# Patient Record
Sex: MALE | Race: WHITE | NOT HISPANIC OR LATINO
[De-identification: names, ages, dates, MRNs, and addresses within clinical notes are randomized per-mention and may not be internally consistent; named-entity substitution may affect disease eponyms.]

---

## 2018-02-06 ENCOUNTER — HOSPITAL (OUTPATIENT)
Dept: OTHER | Age: 83
End: 2018-02-06
Attending: FAMILY MEDICINE

## 2018-03-08 ENCOUNTER — HOSPITAL (OUTPATIENT)
Dept: OTHER | Age: 83
End: 2018-03-08
Attending: INTERNAL MEDICINE

## 2018-03-08 LAB
A/G RATIO_: 0.9
ABS LYMPH: 1.7 K/CUMM (ref 1–3.5)
ABS MONO: 0.5 K/CUMM (ref 0.1–0.8)
ABS NEUTRO: 3.4 K/CUMM (ref 2–8)
ALBUMIN: 3.1 G/DL (ref 3.5–5)
ALK PHOS: 72 UNIT/L (ref 50–124)
ALT/GPT: 70 UNIT/L (ref 0–55)
ANION GAP SERPL CALC-SCNC: 13 MEQ/L (ref 10–20)
APTT PPP: 24 SECONDS (ref 22–30)
APTT PPP: 38 SECONDS (ref 22–30)
AST/GOT: 50 UNIT/L (ref 5–34)
BASOPHIL: 0 % (ref 0–1)
BILI TOTAL: 0.5 MG/DL (ref 0.2–1)
BNP: 1234 PG/ML (ref 1–100)
BUN SERPL-MCNC: 41 MG/DL (ref 6–20)
CALCIUM: 9.1 MG/DL (ref 8.4–10.2)
CHLORIDE: 110 MEQ/L (ref 97–107)
CREATININE: 1.13 MG/DL (ref 0.6–1.3)
DIFF_TYPE?: ABNORMAL
EOSINOPHIL: 3 % (ref 0–6)
GLOBULIN_: 3.3 G/DL (ref 2–4.1)
GLUCOSE LVL: 107 MG/DL (ref 70–99)
HCT VFR BLD CALC: 38 % (ref 36–51)
HEMOLYSIS 2+: NEGATIVE
HEMOLYSIS 4+: NEGATIVE
HGB BLD-MCNC: 11.9 G/DL (ref 12–17)
ICTERIC 4+: NEGATIVE
IMMATURE GRAN: 0.3 % (ref 0–0.3)
INR: 1.1 (ref 0.9–1.1)
INSTR WBC: 5.8 K/CUMM (ref 4–11)
LIPEMIC 3+: NEGATIVE
LYMPHOCYTE: 29 %
MCH RBC QN AUTO: 30 PG (ref 25–35)
MCHC RBC AUTO-ENTMCNC: 32 G/DL (ref 32–37)
MCV RBC AUTO: 95 FL (ref 78–97)
MONOCYTE: 9 %
NEUTROPHIL: 59 %
NRBC BLD MANUAL-RTO: 0 % (ref 0–0.2)
PLATELET: 163 K/CUMM (ref 150–450)
POTASSIUM: 4.8 MEQ/L (ref 3.5–5.1)
PROTHROMBIN TIME: 10.8 SECONDS (ref 9.7–11.6)
RBC # BLD: 3.95 M/CUMM (ref 4.2–6)
RDW: 14.1 % (ref 11.5–14.5)
SODIUM: 143 MEQ/L (ref 136–145)
TCO2: 25 MEQ/L (ref 19–29)
TOTAL PROTEIN: 6.4 G/DL (ref 6.4–8.3)
TROPONIN I: 0.03 NG/ML
TROPONIN I: 0.04 NG/ML
TROPONIN I: 0.04 NG/ML
WBC # BLD: 5.8 K/CUMM (ref 4–11)

## 2018-03-09 LAB
ABS LYMPH: 1.7 K/CUMM (ref 1–3.5)
ABS MONO: 0.3 K/CUMM (ref 0.1–0.8)
ABS NEUTRO: 3.5 K/CUMM (ref 2–8)
ANION GAP SERPL CALC-SCNC: 14 MEQ/L (ref 10–20)
APTT PPP: 46 SECONDS (ref 22–30)
APTT PPP: 46 SECONDS (ref 22–30)
BASOPHIL: 0 % (ref 0–1)
BNP: 1112 PG/ML (ref 1–100)
BUN SERPL-MCNC: 40 MG/DL (ref 6–20)
CALCIUM: 9.7 MG/DL (ref 8.4–10.2)
CHLORIDE: 105 MEQ/L (ref 97–107)
CREATININE: 1.5 MG/DL (ref 0.6–1.3)
DIFF_TYPE?: ABNORMAL
EOSINOPHIL: 3 % (ref 0–6)
GLUCOSE LVL: 153 MG/DL (ref 70–99)
HCT VFR BLD CALC: 40 % (ref 36–51)
HEMOLYSIS 2+: NEGATIVE
HEMOLYSIS 2+: NEGATIVE
HEMOLYSIS 4+: NEGATIVE
HGB BLD-MCNC: 12.6 G/DL (ref 12–17)
IMMATURE GRAN: 0.4 % (ref 0–0.3)
INR: 1.3 (ref 0.9–1.1)
INSTR WBC: 5.7 K/CUMM (ref 4–11)
LYMPHOCYTE: 29 %
MAGNESIUM LEVEL: 1.8 MG/DL (ref 1.6–2.6)
MCH RBC QN AUTO: 30 PG (ref 25–35)
MCHC RBC AUTO-ENTMCNC: 31 G/DL (ref 32–37)
MCV RBC AUTO: 95 FL (ref 78–97)
MONOCYTE: 6 %
NEUTROPHIL: 62 %
NRBC BLD MANUAL-RTO: 0 % (ref 0–0.2)
PLATELET: 163 K/CUMM (ref 150–450)
POTASSIUM: 4 MEQ/L (ref 3.5–5.1)
PROTHROMBIN TIME: 12.7 SECONDS (ref 9.7–11.6)
RBC # BLD: 4.25 M/CUMM (ref 4.2–6)
RDW: 13.8 % (ref 11.5–14.5)
SODIUM: 143 MEQ/L (ref 136–145)
TCO2: 28 MEQ/L (ref 19–29)
WBC # BLD: 5.7 K/CUMM (ref 4–11)

## 2018-04-03 ENCOUNTER — HOSPITAL (OUTPATIENT)
Dept: OTHER | Age: 83
End: 2018-04-03

## 2018-04-03 LAB
ANION GAP SERPL CALC-SCNC: 13 MEQ/L (ref 10–20)
BUN SERPL-MCNC: 76 MG/DL (ref 6–20)
CALCIUM: 9.3 MG/DL (ref 8.4–10.2)
CHLORIDE: 104 MEQ/L (ref 97–107)
CREATININE: 1.93 MG/DL (ref 0.6–1.3)
GLUCOSE LVL: 86 MG/DL (ref 70–99)
HEMOLYSIS 2+: NEGATIVE
POTASSIUM: 5.3 MEQ/L (ref 3.5–5.1)
SODIUM: 139 MEQ/L (ref 136–145)
TCO2: 27 MEQ/L (ref 19–29)

## 2018-12-17 ENCOUNTER — HOSPITAL (OUTPATIENT)
Dept: OTHER | Age: 83
End: 2018-12-17
Attending: INTERNAL MEDICINE

## 2019-03-22 RX ORDER — BUMETANIDE 0.5 MG/1
0.5 TABLET ORAL DAILY
COMMUNITY
End: 2019-08-29 | Stop reason: SDUPTHER

## 2019-03-22 RX ORDER — PRAVASTATIN SODIUM 40 MG
40 TABLET ORAL DAILY
COMMUNITY

## 2019-03-22 RX ORDER — CARVEDILOL 3.12 MG/1
TABLET ORAL
COMMUNITY

## 2019-05-10 ENCOUNTER — OFFICE VISIT (OUTPATIENT)
Dept: CARDIOLOGY | Age: 84
End: 2019-05-10

## 2019-05-10 VITALS
DIASTOLIC BLOOD PRESSURE: 73 MMHG | HEART RATE: 55 BPM | HEIGHT: 70 IN | WEIGHT: 143.3 LBS | BODY MASS INDEX: 20.52 KG/M2 | SYSTOLIC BLOOD PRESSURE: 117 MMHG

## 2019-05-10 DIAGNOSIS — I50.32 CHRONIC DIASTOLIC HEART FAILURE (CMD): ICD-10-CM

## 2019-05-10 DIAGNOSIS — I48.21 PERMANENT ATRIAL FIBRILLATION (CMD): Primary | ICD-10-CM

## 2019-05-10 DIAGNOSIS — E78.49 OTHER HYPERLIPIDEMIA: ICD-10-CM

## 2019-05-10 DIAGNOSIS — I10 ESSENTIAL HYPERTENSION: ICD-10-CM

## 2019-05-10 PROBLEM — I50.30 DIASTOLIC HEART FAILURE (CMD): Status: ACTIVE | Noted: 2019-05-10

## 2019-05-10 PROCEDURE — 99214 OFFICE O/P EST MOD 30 MIN: CPT | Performed by: INTERNAL MEDICINE

## 2019-05-10 ASSESSMENT — PATIENT HEALTH QUESTIONNAIRE - PHQ9
SUM OF ALL RESPONSES TO PHQ9 QUESTIONS 1 AND 2: 0
SUM OF ALL RESPONSES TO PHQ9 QUESTIONS 1 AND 2: 0
2. FEELING DOWN, DEPRESSED OR HOPELESS: NOT AT ALL
1. LITTLE INTEREST OR PLEASURE IN DOING THINGS: NOT AT ALL

## 2019-08-27 ENCOUNTER — TELEPHONE (OUTPATIENT)
Dept: CARDIOLOGY | Age: 84
End: 2019-08-27

## 2019-08-29 RX ORDER — BUMETANIDE 0.5 MG/1
0.5 TABLET ORAL DAILY
Qty: 90 TABLET | Refills: 0 | Status: SHIPPED | OUTPATIENT
Start: 2019-08-29

## 2020-12-03 PROBLEM — R42 DIZZINESS AND GIDDINESS: Status: ACTIVE | Noted: 2020-12-03

## 2020-12-03 PROBLEM — J90 PLEURAL EFFUSION: Status: ACTIVE | Noted: 2020-12-03

## 2020-12-03 PROBLEM — I48.20 CHRONIC ATRIAL FIBRILLATION (H): Status: ACTIVE | Noted: 2020-12-03

## 2020-12-03 PROBLEM — I50.9 CHF (CONGESTIVE HEART FAILURE) (H): Status: ACTIVE | Noted: 2020-12-03

## 2020-12-03 PROBLEM — R33.9 RETENTION OF URINE: Status: ACTIVE | Noted: 2020-12-03

## 2020-12-03 PROBLEM — R00.1 BRADYCARDIA: Status: ACTIVE | Noted: 2020-12-03

## 2020-12-03 PROBLEM — N48.1 BALANITIS: Status: ACTIVE | Noted: 2020-12-03

## 2021-02-07 PROBLEM — L89.612 PRESSURE INJURY OF RIGHT HEEL, STAGE 2 (H): Status: ACTIVE | Noted: 2021-02-07

## 2021-07-06 PROBLEM — L89.512: Status: ACTIVE | Noted: 2021-07-06

## 2021-11-04 ENCOUNTER — APPOINTMENT (OUTPATIENT)
Dept: FAMILY MEDICINE | Facility: OTHER | Age: 86
End: 2021-11-04
Attending: FAMILY MEDICINE
Payer: COMMERCIAL

## 2021-12-02 PROBLEM — L98.9 DISORDER OF SKIN OR SUBCUTANEOUS TISSUE: Status: ACTIVE | Noted: 2021-05-05

## 2021-12-02 PROBLEM — M19.90 CHRONIC ARTHRITIS: Status: ACTIVE | Noted: 2021-12-02

## 2021-12-02 PROBLEM — R62.7 ADULT FAILURE TO THRIVE: Status: ACTIVE | Noted: 2020-12-03

## 2021-12-02 PROBLEM — I48.91 ATRIAL FIBRILLATION (H): Status: ACTIVE | Noted: 2020-12-03

## 2021-12-02 PROBLEM — I25.10 CORONARY ATHEROSCLEROSIS: Status: ACTIVE | Noted: 2021-12-02

## 2021-12-02 PROBLEM — I10 CHRONIC HYPERTENSION: Status: ACTIVE | Noted: 2021-12-02

## 2021-12-02 PROBLEM — H91.93 BILATERAL HEARING LOSS: Status: ACTIVE | Noted: 2021-05-04

## 2021-12-02 PROBLEM — E78.5 HYPERLIPIDEMIA: Status: ACTIVE | Noted: 2021-12-02

## 2022-01-01 ENCOUNTER — NURSING HOME VISIT (OUTPATIENT)
Dept: FAMILY MEDICINE | Facility: OTHER | Age: 87
End: 2022-01-01
Attending: FAMILY MEDICINE
Payer: COMMERCIAL

## 2022-01-01 DIAGNOSIS — Z53.9 ERRONEOUS ENCOUNTER--DISREGARD: Primary | ICD-10-CM

## 2022-01-01 PROCEDURE — 10000001 PR ERRONEOUS ENCOUNTER--DISREGARD: Performed by: FAMILY MEDICINE

## 2022-05-05 ENCOUNTER — NURSING HOME VISIT (OUTPATIENT)
Dept: FAMILY MEDICINE | Facility: OTHER | Age: 87
End: 2022-05-05
Attending: FAMILY MEDICINE
Payer: COMMERCIAL

## 2022-05-05 DIAGNOSIS — Z53.9 ERRONEOUS ENCOUNTER--DISREGARD: Primary | ICD-10-CM

## 2023-01-01 ENCOUNTER — NURSING HOME VISIT (OUTPATIENT)
Dept: FAMILY MEDICINE | Facility: OTHER | Age: 88
End: 2023-01-01
Attending: FAMILY MEDICINE
Payer: COMMERCIAL

## 2023-01-01 VITALS — HEIGHT: 63 IN | WEIGHT: 124.5 LBS | BODY MASS INDEX: 22.06 KG/M2

## 2023-01-01 VITALS — BODY MASS INDEX: 23.04 KG/M2 | WEIGHT: 130 LBS | HEIGHT: 63 IN

## 2023-01-01 VITALS — WEIGHT: 135 LBS | HEIGHT: 63 IN | BODY MASS INDEX: 23.92 KG/M2

## 2023-01-01 VITALS — WEIGHT: 137.5 LBS | BODY MASS INDEX: 24.36 KG/M2 | HEIGHT: 63 IN

## 2023-01-01 DIAGNOSIS — Z78.9 NURSING HOME RESIDENT: Primary | ICD-10-CM

## 2023-01-01 DIAGNOSIS — L89.512 PRESSURE ULCER OF RIGHT ANKLE, STAGE 2 (H): ICD-10-CM

## 2023-01-01 DIAGNOSIS — L89.512 PRESSURE ULCER OF RIGHT ANKLE, STAGE 2 (H): Primary | ICD-10-CM

## 2023-01-01 DIAGNOSIS — L89.612 PRESSURE INJURY OF RIGHT HEEL, STAGE 2 (H): ICD-10-CM

## 2023-01-01 DIAGNOSIS — L89.612 PRESSURE INJURY OF RIGHT HEEL, STAGE 2 (H): Primary | ICD-10-CM

## 2023-01-01 DIAGNOSIS — Z53.9 ERRONEOUS ENCOUNTER--DISREGARD: Primary | ICD-10-CM

## 2023-01-01 PROCEDURE — 99308 SBSQ NF CARE LOW MDM 20: CPT | Performed by: FAMILY MEDICINE

## 2023-01-01 RX ORDER — SULFAMETHOXAZOLE/TRIMETHOPRIM 800-160 MG
1 TABLET ORAL 2 TIMES DAILY
COMMUNITY
End: 2023-01-01

## 2023-01-01 RX ORDER — CIPROFLOXACIN 250 MG/1
250 TABLET, FILM COATED ORAL 2 TIMES DAILY
COMMUNITY
End: 2023-01-01

## 2023-01-01 RX ORDER — LOPERAMIDE HCL 2 MG
2 CAPSULE ORAL PRN
COMMUNITY

## 2023-01-01 RX ORDER — MUPIROCIN 20 MG/G
OINTMENT TOPICAL DAILY
COMMUNITY

## 2023-01-01 RX ORDER — HYDROCODONE BITARTRATE AND ACETAMINOPHEN 5; 325 MG/1; MG/1
TABLET ORAL
Qty: 60 TABLET | Refills: 0 | Status: SHIPPED | OUTPATIENT
Start: 2023-01-01

## 2023-01-01 RX ORDER — MEDICAL SUPPLY, MISCELLANEOUS
EACH MISCELLANEOUS DAILY
COMMUNITY

## 2023-01-01 RX ORDER — MELATONIN 10 MG
CAPSULE ORAL DAILY
COMMUNITY

## 2023-01-01 RX ORDER — LOPERAMIDE HCL 2 MG
2 CAPSULE ORAL PRN
COMMUNITY
End: 2023-01-01

## 2023-01-01 RX ORDER — PROPYLENE GLYCOL 0.06 MG/ML
SOLUTION/ DROPS OPHTHALMIC
COMMUNITY
Start: 2022-02-21

## 2023-01-01 RX ORDER — HYDROCODONE BITARTRATE AND ACETAMINOPHEN 5; 325 MG/1; MG/1
1 TABLET ORAL EVERY 4 HOURS PRN
COMMUNITY
End: 2023-01-01

## 2023-02-16 NOTE — PROGRESS NOTES
Nursing Home Visit    HISTORY OF PRESENT ILLNESS:  Clovis is a 89 year old male (6/29/1924)  resident of [facility] who is being seen today for regulatory NH visit as well as wound evaluation.    The patient has risk factors for skin breakdown including immobility, poor nutrition , advanced age, and altered mental status due to dementia.    He has a history of the following wounds:    Pressure ulcer, stage 2, right lateral ankle  The right lateral ankle wound developed in the facility and measures 0.3 x 0.6 x 0.1 as compared to a previous of 0.5 x 0.6x 0.1.  There is no associated pain,  drainage,  bleeding,  induration, or maceration. Clovis prefers to lay on his side and family notes presence over the last several years. The pressure areas are his position of comfort.  Protective measures in place.This has now healed    The patient notes no associated symptoms.        Discussed with nursing staff who note no other issues.    The patient is seen in his room.  Family is not present.     Medical records are reviewed.    Current medications, allergies, and interdisciplinary care plan are reviewed.      Patient Active Problem List    Diagnosis Date Noted     Chronic arthritis 12/02/2021     Priority: Medium     Chronic hypertension 12/02/2021     Priority: Medium     Coronary atherosclerosis 12/02/2021     Priority: Medium     Hyperlipidemia 12/02/2021     Priority: Medium     Pressure ulcer of right ankle, stage 2 (H) 07/06/2021     Priority: Medium     Disorder of skin or subcutaneous tissue 05/05/2021     Priority: Medium     Bilateral hearing loss 05/04/2021     Priority: Medium     Pressure injury of right heel, stage 2 (H) 02/07/2021     Priority: Medium     Adult failure to thrive 12/03/2020     Priority: Medium     Retention of urine 12/03/2020     Priority: Medium     CHF (congestive heart failure) (H) 12/03/2020     Priority: Medium     Bradycardia 12/03/2020     Priority: Medium     Atrial  fibrillation (H) 12/03/2020     Priority: Medium     Dizziness and giddiness 12/03/2020     Priority: Medium     Pleural effusion 12/03/2020     Priority: Medium     Balanitis 12/03/2020     Priority: Medium          Social History     Socioeconomic History     Marital status:      Spouse name: Not on file     Number of children: Not on file     Years of education: Not on file     Highest education level: Not on file   Occupational History     Not on file   Tobacco Use     Smoking status: Not on file     Smokeless tobacco: Not on file   Substance and Sexual Activity     Alcohol use: Not on file     Drug use: Not on file     Sexual activity: Not on file   Other Topics Concern     Not on file   Social History Narrative     Not on file     Social Determinants of Health     Financial Resource Strain: Not on file   Food Insecurity: Not on file   Transportation Needs: Not on file   Physical Activity: Not on file   Stress: Not on file   Social Connections: Not on file   Intimate Partner Violence: Not on file   Housing Stability: Not on file        Current Outpatient Medications   Medication Sig     acetaminophen (TYLENOL) 500 MG tablet Take 500 mg by mouth every 8 hours as needed for mild pain     artificial tears OINT ophthalmic ointment Place into both eyes every 4 hours as needed for dry eyes     aspirin 81 MG EC tablet Take 81 mg by mouth daily     ciprofloxacin (CIPRO) 250 MG tablet Take 250 mg by mouth 2 times daily     loperamide (IMODIUM) 2 MG capsule Take 2 mg by mouth as needed for diarrhea     losartan (COZAAR) 25 MG tablet Take 12.5 mg by mouth     metoprolol succinate ER (TOPROL-XL) 25 MG 24 hr tablet Take 12.5 mg by mouth daily     multivitamin w/minerals (THERA-VIT-M) tablet Take 1 tablet by mouth daily     Nutritional Supplements (NUTRITIONAL DRINK) LIQD Take by mouth daily Boost     propylene glycol (SYSTANE COMPLETE) 0.6 % SOLN ophthalmic solution PLACE 1-2 DROPS INTO EACH EYE AS NEEDED      "senna-docusate (SENOKOT-S/PERICOLACE) 8.6-50 MG tablet Take 1 tablet by mouth 2 times daily     No current facility-administered medications for this visit.       No Known Allergies    I have reviewed the care plan and do agree with the plan.    OBJECTIVE:  Ht 1.6 m (5' 3\")   Wt 56.5 kg (124 lb 8 oz)   BMI 22.05 kg/m      GENERAL: Healthy, alert and no distress  EYES: Eyes grossly normal to inspection.  No discharge or erythema, or obvious scleral/conjunctival abnormalities.  RESP: No audible wheeze, cough, or visible cyanosis.  No visible retractions or increased work of breathing.    SKIN: no suspicious lesions or rashes.  Wounds as above      Previous            Current    Lab/Diagnostic data:    Reviewed     ASSESSMENT/ORDERS:  Nursing Home Visit  Discussed with staff. Care plan reviewed and orders updated.  Chronic issues are stable. Routine 30 day Nursing Home follow up.     Pressure ulcer of right ankle, stage 2 (H)  Continue Gel Boot to heel.  Allevyn 3x3.  Artificial lambs wool at foot of bed.        Total time spent with patient visit was 25 min including patient visit, review of pertinent clinical information, and treatment plan.      Kishore Hi MD FAAFP [facility]  Geriatrics  Hospice and Palliative Medicine  "

## 2023-03-21 NOTE — PROGRESS NOTES
Nursing Home Visit    HISTORY OF PRESENT ILLNESS:  Clovis is a 89 year old male (6/29/1924)  resident of [facility] who is being seen today for regulatory NH visit as well as wound evaluation.    The patient has risk factors for skin breakdown including immobility, poor nutrition , advanced age, and altered mental status due to dementia.    He has a history of the following wounds:    Pressure ulcer, stage 2, right lateral ankle  The right lateral ankle wound developed in the facility and measures 0.3 x 0.6 x 0.1 as compared to a previous of 0.5 x 0.6x 0.1.  There is no associated pain,  drainage,  bleeding,  induration, or maceration. Clovis prefers to lay on his side and family notes presence over the last several years. The pressure areas are his position of comfort.  Protective measures in place.    Pressure ulcer, stage 2 right heel  He has developed pressure ulcer right heel.  There is no associated pain, bleeding, induration, or maceration.  This measures 2.5 cm x 1.7cm x 0.1 cm.  The patient notes no associated symptoms.        Discussed with nursing staff who note no other issues.    The patient is seen in his room.  Family is not present.     Medical records are reviewed.    Current medications, allergies, and interdisciplinary care plan are reviewed.      Patient Active Problem List    Diagnosis Date Noted     Chronic arthritis 12/02/2021     Priority: Medium     Chronic hypertension 12/02/2021     Priority: Medium     Coronary atherosclerosis 12/02/2021     Priority: Medium     Hyperlipidemia 12/02/2021     Priority: Medium     Pressure ulcer of right ankle, stage 2 (H) 07/06/2021     Priority: Medium     Disorder of skin or subcutaneous tissue 05/05/2021     Priority: Medium     Bilateral hearing loss 05/04/2021     Priority: Medium     Pressure injury of right heel, stage 2 (H) 02/07/2021     Priority: Medium     Adult failure to thrive 12/03/2020     Priority: Medium     Retention of urine  12/03/2020     Priority: Medium     CHF (congestive heart failure) (H) 12/03/2020     Priority: Medium     Bradycardia 12/03/2020     Priority: Medium     Atrial fibrillation (H) 12/03/2020     Priority: Medium     Dizziness and giddiness 12/03/2020     Priority: Medium     Pleural effusion 12/03/2020     Priority: Medium     Balanitis 12/03/2020     Priority: Medium          Social History     Socioeconomic History     Marital status:      Spouse name: Not on file     Number of children: Not on file     Years of education: Not on file     Highest education level: Not on file   Occupational History     Not on file   Tobacco Use     Smoking status: Not on file     Smokeless tobacco: Not on file   Substance and Sexual Activity     Alcohol use: Not on file     Drug use: Not on file     Sexual activity: Not on file   Other Topics Concern     Not on file   Social History Narrative     Not on file     Social Determinants of Health     Financial Resource Strain: Not on file   Food Insecurity: Not on file   Transportation Needs: Not on file   Physical Activity: Not on file   Stress: Not on file   Social Connections: Not on file   Intimate Partner Violence: Not on file   Housing Stability: Not on file        Current Outpatient Medications   Medication Sig     acetaminophen (TYLENOL) 500 MG tablet Take 500 mg by mouth every 8 hours as needed for mild pain     amoxicillin-clavulanate (AUGMENTIN) 875-125 MG tablet Take 1 tablet by mouth 2 times daily     artificial tears OINT ophthalmic ointment Place into both eyes every 4 hours as needed for dry eyes     aspirin 81 MG EC tablet Take 81 mg by mouth daily     HYDROcodone-acetaminophen (NORCO) 5-325 MG tablet Take 1 tablet by mouth every 4 hours as needed for severe pain (7-10)     losartan (COZAAR) 25 MG tablet Take 12.5 mg by mouth     metoprolol succinate ER (TOPROL-XL) 25 MG 24 hr tablet Take 12.5 mg by mouth daily     multivitamin w/minerals (THERA-VIT-M) tablet Take  "1 tablet by mouth daily     mupirocin (BACTROBAN) 2 % external ointment Apply topically daily     Nutritional Supplements (NUTRITIONAL DRINK) LIQD Take by mouth daily Boost     propylene glycol (SYSTANE COMPLETE) 0.6 % SOLN ophthalmic solution PLACE 1-2 DROPS INTO EACH EYE AS NEEDED     senna-docusate (SENOKOT-S/PERICOLACE) 8.6-50 MG tablet Take 1 tablet by mouth 2 times daily     sulfamethoxazole-trimethoprim (BACTRIM DS) 800-160 MG tablet Take 1 tablet by mouth 2 times daily     No current facility-administered medications for this visit.       No Known Allergies    I have reviewed the care plan and do agree with the plan.    OBJECTIVE:  Ht 1.6 m (5' 3\")   Wt 62.4 kg (137 lb 8 oz)   BMI 24.36 kg/m      GENERAL: Healthy, alert and no distress  EYES: Eyes grossly normal to inspection.  No discharge or erythema, or obvious scleral/conjunctival abnormalities.  RESP: No audible wheeze, cough, or visible cyanosis.  No visible retractions or increased work of breathing.    SKIN: no suspicious lesions or rashes.  Wounds as above      Previous        Previous      Previous      Current        Current      Lab/Diagnostic data:    Reviewed     ASSESSMENT/ORDERS:      Pressure injury of right heel, stage 2 (H)  He was seen by Podiatry. Orders written for Bactroban to wound bed and cover wit 4X4 gauze secured with Kerlix.  Will continue.       Pressure ulcer of right ankle, stage 2 (H)  Bactroban to wound bed and cover wit 4X4 gauze secured with Kerlix. Continue Artificial lambs wool at foot of bed.        Total time spent with patient visit was 25 min including patient visit, review of pertinent clinical information, and treatment plan.      Kishore Hi MD FAAFP [facility]  Geriatrics  Hospice and Palliative Medicine  "

## 2023-04-06 NOTE — PROGRESS NOTES
Nursing Home Visit    HISTORY OF PRESENT ILLNESS:  Clovis is a 89 year old male (6/29/1924)  resident of [facility] who is being seen today for regulatory NH visit as well as wound evaluation.    The patient has risk factors for skin breakdown including immobility, poor nutrition , advanced age, and altered mental status due to dementia.    He has a history of the following wounds:    Pressure ulcer, stage 2, right lateral ankle  The right lateral ankle wound developed in the facility and measures 0.3 x 0.6 x 0.1 as compared to a previous of 0.5 x 0.6x 0.1.  There is no associated pain,  drainage,  bleeding,  induration, or maceration. Clovis prefers to lay on his side and family notes presence over the last several years. The pressure areas are his position of comfort.  Protective measures in place.    Pressure ulcer, stage 2 right heel  He has developed pressure ulcer right heel.  There is no associated pain, bleeding, induration, or maceration.  This measures 2.5 cm x 1.7cm x 0.1 cm.    The patient notes no associated symptoms.        Discussed with nursing staff who note no other issues.    The patient is seen in his room.  Family is not present.     Medical records are reviewed.    Current medications, allergies, and interdisciplinary care plan are reviewed.      Patient Active Problem List    Diagnosis Date Noted     Chronic arthritis 12/02/2021     Priority: Medium     Chronic hypertension 12/02/2021     Priority: Medium     Coronary atherosclerosis 12/02/2021     Priority: Medium     Hyperlipidemia 12/02/2021     Priority: Medium     Pressure ulcer of right ankle, stage 2 (H) 07/06/2021     Priority: Medium     Disorder of skin or subcutaneous tissue 05/05/2021     Priority: Medium     Bilateral hearing loss 05/04/2021     Priority: Medium     Pressure injury of right heel, stage 2 (H) 02/07/2021     Priority: Medium     Adult failure to thrive 12/03/2020     Priority: Medium     Retention of  urine 12/03/2020     Priority: Medium     CHF (congestive heart failure) (H) 12/03/2020     Priority: Medium     Bradycardia 12/03/2020     Priority: Medium     Atrial fibrillation (H) 12/03/2020     Priority: Medium     Dizziness and giddiness 12/03/2020     Priority: Medium     Pleural effusion 12/03/2020     Priority: Medium     Balanitis 12/03/2020     Priority: Medium          Social History     Socioeconomic History     Marital status:      Spouse name: Not on file     Number of children: Not on file     Years of education: Not on file     Highest education level: Not on file   Occupational History     Not on file   Tobacco Use     Smoking status: Not on file     Smokeless tobacco: Not on file   Vaping Use     Vaping status: Not on file   Substance and Sexual Activity     Alcohol use: Not on file     Drug use: Not on file     Sexual activity: Not on file   Other Topics Concern     Not on file   Social History Narrative     Not on file     Social Determinants of Health     Financial Resource Strain: Not on file   Food Insecurity: Not on file   Transportation Needs: Not on file   Physical Activity: Not on file   Stress: Not on file   Social Connections: Not on file   Intimate Partner Violence: Not on file   Housing Stability: Not on file        Current Outpatient Medications   Medication Sig     acetaminophen (TYLENOL) 500 MG tablet Take 500 mg by mouth every 8 hours as needed for mild pain     artificial tears OINT ophthalmic ointment Place into both eyes every 4 hours as needed for dry eyes     aspirin 81 MG EC tablet Take 81 mg by mouth daily     HYDROcodone-acetaminophen (NORCO) 5-325 MG tablet Take 1 tablet by mouth every 4 hours as needed for severe pain (7-10)     loperamide (IMODIUM) 2 MG capsule Take 2 mg by mouth as needed for diarrhea     losartan (COZAAR) 25 MG tablet Take 12.5 mg by mouth     melatonin 5 MG CAPS Take by mouth daily     metoprolol succinate ER (TOPROL-XL) 25 MG 24 hr tablet  "Take 12.5 mg by mouth daily     multivitamin w/minerals (THERA-VIT-M) tablet Take 1 tablet by mouth daily     mupirocin (BACTROBAN) 2 % external ointment Apply topically daily     Nutritional Supplements (NUTRITIONAL DRINK) LIQD Take by mouth daily Boost     propylene glycol (SYSTANE COMPLETE) 0.6 % SOLN ophthalmic solution PLACE 1-2 DROPS INTO EACH EYE AS NEEDED     senna-docusate (SENOKOT-S/PERICOLACE) 8.6-50 MG tablet Take 1 tablet by mouth 2 times daily     No current facility-administered medications for this visit.       No Known Allergies    I have reviewed the care plan and do agree with the plan.    OBJECTIVE:  Ht 1.6 m (5' 3\")   Wt 61.2 kg (135 lb)   BMI 23.91 kg/m      GENERAL: Healthy, alert and no distress  EYES: Eyes grossly normal to inspection.  No discharge or erythema, or obvious scleral/conjunctival abnormalities.  RESP: No audible wheeze, cough, or visible cyanosis.  No visible retractions or increased work of breathing.    SKIN: no suspicious lesions or rashes.  Wounds as above      Previous        Previous      Previous      Current        Current      Lab/Diagnostic data:    Reviewed     ASSESSMENT/ORDERS:      Pressure injury of right heel, stage 2 (H)  He was seen by Podiatry. Orders written for Bactroban to wound bed and cover wit 4X4 gauze secured with Kerlix.  Will continue.       Pressure ulcer of right ankle, stage 2 (H)  Bactroban to wound bed and cover wit 4X4 gauze secured with Kerlix. Continue Artificial lambs wool at foot of bed.        Total time spent with patient visit was 25 min including patient visit, review of pertinent clinical information, and treatment plan.      Kishore Hi MD FAAFP [facility]  Geriatrics  Hospice and Palliative Medicine  "

## 2023-05-04 NOTE — PROGRESS NOTES
Nursing Home Visit    HISTORY OF PRESENT ILLNESS:  Clovis is a 89 year old male (6/29/1924)  resident of [facility] who is being seen today for regulatory NH visit as well as wound evaluation.    The patient has risk factors for skin breakdown including immobility, poor nutrition , advanced age, and altered mental status due to dementia.    He has a history of the following wounds:    Pressure ulcer, stage 2, right lateral ankle  The right lateral ankle wound developed in the facility and measures 0.3 x 0.6 x 0.1 as compared to a previous of 0.5 x 0.6x 0.1.  There is no associated pain,  drainage,  bleeding,  induration, or maceration. Clovis prefers to lay on his side and family notes presence over the last several years. The pressure areas are his position of comfort.  Protective measures in place.    Pressure ulcer, stage 2 right heel  He has developed pressure ulcer right heel.  There is no associated pain, bleeding, induration, or maceration.  This measures 2.5 cm x 1.7cm x 0.1 cm.    The patient notes no associated symptoms.        Discussed with nursing staff who note no other issues.    The patient is seen in his room.  Family is not present.     Medical records are reviewed.    Current medications, allergies, and interdisciplinary care plan are reviewed.      Patient Active Problem List    Diagnosis Date Noted     Chronic arthritis 12/02/2021     Priority: Medium     Chronic hypertension 12/02/2021     Priority: Medium     Coronary atherosclerosis 12/02/2021     Priority: Medium     Hyperlipidemia 12/02/2021     Priority: Medium     Pressure ulcer of right ankle, stage 2 (H) 07/06/2021     Priority: Medium     Disorder of skin or subcutaneous tissue 05/05/2021     Priority: Medium     Bilateral hearing loss 05/04/2021     Priority: Medium     Pressure injury of right heel, stage 2 (H) 02/07/2021     Priority: Medium     Adult failure to thrive 12/03/2020     Priority: Medium     Retention of  urine 12/03/2020     Priority: Medium     CHF (congestive heart failure) (H) 12/03/2020     Priority: Medium     Bradycardia 12/03/2020     Priority: Medium     Atrial fibrillation (H) 12/03/2020     Priority: Medium     Dizziness and giddiness 12/03/2020     Priority: Medium     Pleural effusion 12/03/2020     Priority: Medium     Balanitis 12/03/2020     Priority: Medium          Social History     Socioeconomic History     Marital status:      Spouse name: Not on file     Number of children: Not on file     Years of education: Not on file     Highest education level: Not on file   Occupational History     Not on file   Tobacco Use     Smoking status: Not on file     Smokeless tobacco: Not on file   Vaping Use     Vaping status: Not on file   Substance and Sexual Activity     Alcohol use: Not on file     Drug use: Not on file     Sexual activity: Not on file   Other Topics Concern     Not on file   Social History Narrative     Not on file     Social Determinants of Health     Financial Resource Strain: Not on file   Food Insecurity: Not on file   Transportation Needs: Not on file   Physical Activity: Not on file   Stress: Not on file   Social Connections: Not on file   Intimate Partner Violence: Not on file   Housing Stability: Not on file        Current Outpatient Medications   Medication Sig     acetaminophen (TYLENOL) 500 MG tablet Take 500 mg by mouth every 8 hours as needed for mild pain     artificial tears OINT ophthalmic ointment Place into both eyes every 4 hours as needed for dry eyes     aspirin 81 MG EC tablet Take 81 mg by mouth daily     HYDROcodone-acetaminophen (NORCO) 5-325 MG tablet TAKE 1 TABLET BY MOUTH EVERY 4 HOURS AS NEEDED FOR PAIN     loperamide (IMODIUM) 2 MG capsule Take 2 mg by mouth as needed for diarrhea     losartan (COZAAR) 25 MG tablet Take 12.5 mg by mouth     Melatonin 10 MG CAPS Take by mouth daily     metoprolol succinate ER (TOPROL-XL) 25 MG 24 hr tablet Take 12.5 mg by  "mouth daily     multivitamin w/minerals (THERA-VIT-M) tablet Take 1 tablet by mouth daily     mupirocin (BACTROBAN) 2 % external ointment Apply topically daily     Nutritional Supplements (NUTRITIONAL DRINK) LIQD Take by mouth daily Boost     propylene glycol (SYSTANE COMPLETE) 0.6 % SOLN ophthalmic solution PLACE 1-2 DROPS INTO EACH EYE AS NEEDED     senna-docusate (SENOKOT-S/PERICOLACE) 8.6-50 MG tablet Take 1 tablet by mouth 2 times daily     No current facility-administered medications for this visit.       No Known Allergies    I have reviewed the care plan and do agree with the plan.    OBJECTIVE:  Ht 1.6 m (5' 3\")   Wt 59 kg (130 lb)   BMI 23.03 kg/m      GENERAL: Healthy, alert and no distress  EYES: Eyes grossly normal to inspection.  No discharge or erythema, or obvious scleral/conjunctival abnormalities.  RESP: No audible wheeze, cough, or visible cyanosis.  No visible retractions or increased work of breathing.    SKIN: no suspicious lesions or rashes.  Wounds as above      Previous        Previous      Previous      Current        Current      Lab/Diagnostic data:    Reviewed     ASSESSMENT/ORDERS:      Pressure injury of right heel, stage 2 (H)  He was seen by Podiatry. Orders written for Bactroban to wound bed and cover wit 4X4 gauze secured with Kerlix.  Will continue.       Pressure ulcer of right ankle, stage 2 (H)  Bactroban to wound bed and cover wit 4X4 gauze secured with Kerlix. Continue Artificial lambs wool at foot of bed.        Total time spent with patient visit was 25 min including patient visit, review of pertinent clinical information, and treatment plan.      Kishore Hi MD FAAFP [facility]  Geriatrics  Hospice and Palliative Medicine  "